# Patient Record
Sex: FEMALE | Race: WHITE | NOT HISPANIC OR LATINO | URBAN - METROPOLITAN AREA
[De-identification: names, ages, dates, MRNs, and addresses within clinical notes are randomized per-mention and may not be internally consistent; named-entity substitution may affect disease eponyms.]

---

## 2020-10-15 ENCOUNTER — NEW PATIENT (OUTPATIENT)
Dept: URBAN - METROPOLITAN AREA CLINIC 64 | Facility: CLINIC | Age: 40
End: 2020-10-15
Payer: COMMERCIAL

## 2020-10-15 PROCEDURE — 92015 DETERMINE REFRACTIVE STATE: CPT | Performed by: OPTOMETRIST

## 2020-10-15 PROCEDURE — 92004 COMPRE OPH EXAM NEW PT 1/>: CPT | Performed by: OPTOMETRIST

## 2020-10-15 PROCEDURE — 92310 CONTACT LENS FITTING OU: CPT | Performed by: OPTOMETRIST

## 2020-10-15 ASSESSMENT — INTRAOCULAR PRESSURE
OD: 15
OS: 16

## 2020-10-15 ASSESSMENT — VISUAL ACUITY
OD: 20/20
OS: 20/20

## 2020-10-15 ASSESSMENT — KERATOMETRY
OS: 43.02
OD: 42.91

## 2021-10-13 ENCOUNTER — OFFICE VISIT (OUTPATIENT)
Dept: URBAN - METROPOLITAN AREA CLINIC 64 | Facility: CLINIC | Age: 41
End: 2021-10-13
Payer: COMMERCIAL

## 2021-10-13 DIAGNOSIS — H52.13 MYOPIA, BILATERAL: Primary | ICD-10-CM

## 2021-10-13 PROCEDURE — 92310 CONTACT LENS FITTING OU: CPT | Performed by: OPTOMETRIST

## 2021-10-13 PROCEDURE — 92014 COMPRE OPH EXAM EST PT 1/>: CPT | Performed by: OPTOMETRIST

## 2021-10-13 ASSESSMENT — VISUAL ACUITY
OD: 20/20
OS: 20/20

## 2021-10-13 ASSESSMENT — INTRAOCULAR PRESSURE
OD: 10
OS: 9

## 2021-10-13 ASSESSMENT — KERATOMETRY
OS: 43.10
OD: 43.16

## 2021-10-13 NOTE — IMPRESSION/PLAN
Impression: Myopia, bilateral Plan: Updated glasses and contact lens Rx. OD CL does not feel right. Good fit. We'll make small adjustment to Rx OD. Trials before boxes. Call if still having trouble and we'll try a different brand.